# Patient Record
Sex: FEMALE | Race: BLACK OR AFRICAN AMERICAN | NOT HISPANIC OR LATINO | URBAN - METROPOLITAN AREA
[De-identification: names, ages, dates, MRNs, and addresses within clinical notes are randomized per-mention and may not be internally consistent; named-entity substitution may affect disease eponyms.]

---

## 2023-03-17 ENCOUNTER — EMERGENCY (EMERGENCY)
Facility: HOSPITAL | Age: 28
LOS: 1 days | End: 2023-03-17
Attending: EMERGENCY MEDICINE
Payer: SELF-PAY

## 2023-03-17 VITALS
SYSTOLIC BLOOD PRESSURE: 107 MMHG | TEMPERATURE: 98 F | RESPIRATION RATE: 16 BRPM | HEART RATE: 95 BPM | HEIGHT: 62 IN | OXYGEN SATURATION: 99 % | DIASTOLIC BLOOD PRESSURE: 61 MMHG | WEIGHT: 145.06 LBS

## 2023-03-17 VITALS
SYSTOLIC BLOOD PRESSURE: 112 MMHG | RESPIRATION RATE: 18 BRPM | HEART RATE: 87 BPM | TEMPERATURE: 98 F | DIASTOLIC BLOOD PRESSURE: 65 MMHG | OXYGEN SATURATION: 99 %

## 2023-03-17 PROCEDURE — 99283 EMERGENCY DEPT VISIT LOW MDM: CPT

## 2023-03-17 PROCEDURE — 99282 EMERGENCY DEPT VISIT SF MDM: CPT

## 2023-03-17 NOTE — ED PROVIDER NOTE - NS ED ROS FT
CONSTITUTIONAL: No fevers, no chills, no lightheadedness, no dizziness  EYES: no visual changes, no eye pain  EARS: no ear drainage, no ear pain, no change in hearing  NOSE: no nasal congestion  MOUTH/THROAT: no sore throat  CV: No chest pain, no palpitations  RESP: No SOB, no cough  GI: No n/v/d, no abd pain  : no dysuria, no hematuria, no flank pain  MSK: no back pain, no extremity pain  SKIN: no rashes  NEURO: no headache, no focal weakness, no decreased sensation/parasthesias   PSYCHIATRIC: no known mental health issues CONSTITUTIONAL: No fevers, no chills, no lightheadedness, no dizziness  EYES: no visual changes, no eye pain  EARS: no ear drainage, no ear pain, no change in hearing  NOSE: +nasal congestion  MOUTH/THROAT: no sore throat  CV: No chest pain, no palpitations  RESP: No SOB, +cough  GI: No n/v/d, no abd pain  : no dysuria, no hematuria, no flank pain  MSK: no back pain, no extremity pain  SKIN: no rashes  NEURO: no headache, no focal weakness, no decreased sensation/parasthesias   PSYCHIATRIC: no known mental health issues

## 2023-03-17 NOTE — ED PROVIDER NOTE - OBJECTIVE STATEMENT
28yo F, denies medical history, presents for a cough. Reports that she recently came from [facility] on Monday and developed cold symptoms yesterday. 28yo F, denies medical history, presents for a cough. Reports that she recently came from [facility] on Monday and developed cold symptoms yesterday. Sxs include nasal congestion, postnasal drip and nonproductive cough. Has not tried any medications for relief. Daughter is also sick at home. Denies any chest pain, shortness of breath, fevers, nausea, vomiting.

## 2023-03-17 NOTE — ED PROVIDER NOTE - CLINICAL SUMMARY MEDICAL DECISION MAKING FREE TEXT BOX
26yo F, denies medical history, presents for a nonproductive cough and cold symptoms that began yesterday. Has not tried any medications for relief. Vitals wnl. Physical exam unremarkable, lungs clear, heart rrr, abdomen soft, nontender. Likely viral URI, will discharge home with return precautions.

## 2023-03-17 NOTE — ED PROVIDER NOTE - ATTENDING CONTRIBUTION TO CARE
This is a 27-year-old female who is arriving with a dry cough.  No shortness of breath.  No phlegm.  No fevers.  She is been sick for a day or 2.  Notably she just traveled from [facility].  She is not a smoker.  She is not on birth control.  She has never had a blood clot before.  Her daughter has been sick for about a week and is also being seen as a patient.  Patient appears comfortable and is breathing with a normal rate and effort  At this time there is no indication for further evaluation and treatment for the patient's likely viral URI causing cough.  Return precautions noted.

## 2023-03-17 NOTE — ED ADULT NURSE NOTE - OBJECTIVE STATEMENT
27 y.o F with no significant PMH presenting to the ED for nonproductive cough and nasal congestion x4 days. Pt states that she recently traveled to [facility] and [place] the [place] on Monday and developed cold symptoms. Pt endorses that her daughter has also been sick at home. Pt denies fever/chills, H/A, lightheadedness/dizziness, vision changes, SOB, chest pain, abd pain, N/V/D, constipation, dysuria, hematuria, hematochezia, weakness, numbness, and tingling. Patient safety maintained, bed is in lowest position, wheels locked, and side rails raised. Patient oriented to call bell, and call bell is within reach.

## 2023-03-17 NOTE — ED PROVIDER NOTE - NSFOLLOWUPINSTRUCTIONS_ED_ALL_ED_FT
Cold Symptoms    WHAT YOU NEED TO KNOW:    A cold is an infection caused by a virus. The infection causes your upper respiratory system to become inflamed. Common symptoms of a cold include sneezing, dry throat, a stuffy nose, headache, watery eyes, and a cough. Your cough may be dry, or you may cough up mucus. You may also have muscle aches, joint pain, and tiredness. Rarely, you may have a fever. Most colds go away without treatment.    DISCHARGE INSTRUCTIONS:    Return to the emergency department if:    You have increased tiredness and weakness.    You are unable to eat.    Your heart is beating much faster than usual for you.    You see white spots in the back of your throat and your neck is swollen and sore to the touch.    You see pinpoint or larger reddish-purple dots on your skin.  Contact your healthcare provider if:    You have a fever higher than 102°F (38.9°C).    You have new or worsening shortness of breath.    You have thick nasal drainage for more than 2 days.    Your symptoms do not improve or get worse within 5 days.    You have questions or concerns about your condition or care.  Medicines: The following medicines may be suggested by your healthcare provider to decrease your cold symptoms. These medicines are available without a doctor's order. Ask which medicines to take and when to take them. Follow directions.    NSAIDs or acetaminophen help to bring down a fever or decrease pain.    Decongestants help decrease nasal stuffiness.    Antihistamines help decrease sneezing and a runny nose.    Cough suppressants help decrease how much you cough.    Expectorants help loosen mucus so you can cough it up.    Take your medicine as directed. Contact your healthcare provider if you think your medicine is not helping or if you have side effects. Tell your provider if you are allergic to any medicine. Keep a list of the medicines, vitamins, and herbs you take. Include the amounts, and when and why you take them. Bring the list or the pill bottles to follow-up visits. Carry your medicine list with you in case of an emergency.  Symptom relief: The following may help relieve cold symptoms, such as a dry throat and congestion:    Gargle with mouthwash or warm salt water as directed.    Suck on throat lozenges or hard candy.    Use a cold or warm vaporizer or humidifier to ease your breathing.    Rest for at least 2 days and then as needed to decrease tiredness and weakness.    Use petroleum based jelly around your nostrils to decrease irritation from blowing your nose.  Drink liquids: Liquids will help thin and loosen thick mucus so you can cough it up. Liquids will also keep you hydrated. Ask your healthcare provider which liquids are best for you and how much to drink each day.    Prevent the spread of germs: You can spread your cold germs to others for at least 3 days after your symptoms start. Wash your hands often. Do not share items, such as eating utensils. Cover your nose and mouth when you cough or sneeze using the crook of your elbow instead of your hands. Throw used tissues in the garbage.    Do not smoke: Smoking may worsen your symptoms and increase the length of time you feel sick. Talk with your healthcare provider if you need help to stop smoking.    Follow up with your doctor as directed: Write down your questions so you remember to ask them during your visits.

## 2023-03-17 NOTE — ED PROVIDER NOTE - PATIENT PORTAL LINK FT
You can access the FollowMyHealth Patient Portal offered by [facility] by registering at the following website: [email or web address]. By joining [employer]’s FollowMyHealth portal, you will also be able to view your health information using other applications (apps) compatible with our system.

## 2023-03-17 NOTE — ED PROVIDER NOTE - PHYSICAL EXAMINATION
Physical Exam:  Gen: NAD, AOx3, non-toxic appearing, able to ambulate without assistance  Head: NCAT  HEENT: EOMI, PEERLA, normal conjunctiva, tongue midline, oral mucosa moist  Lung: CTAB, no respiratory distress, no wheezes/rhonchi/rales B/L, speaking in full sentences  CV: RRR, no murmurs, rubs or gallops  Abd: soft, NT, ND, no guarding, no rigidity, no rebound tenderness  MSK: no visible deformities, ROM normal in UE/LE, no back pain  Neuro: No focal sensory or motor deficits  Skin: Warm, well perfused, no rash, no leg swelling  Psych: normal affect, calm
